# Patient Record
Sex: MALE | Race: WHITE | NOT HISPANIC OR LATINO | Employment: FULL TIME | URBAN - METROPOLITAN AREA
[De-identification: names, ages, dates, MRNs, and addresses within clinical notes are randomized per-mention and may not be internally consistent; named-entity substitution may affect disease eponyms.]

---

## 2021-06-18 ENCOUNTER — OFFICE VISIT (OUTPATIENT)
Dept: URGENT CARE | Facility: CLINIC | Age: 36
End: 2021-06-18

## 2021-06-18 VITALS
WEIGHT: 188.6 LBS | SYSTOLIC BLOOD PRESSURE: 118 MMHG | DIASTOLIC BLOOD PRESSURE: 78 MMHG | HEART RATE: 66 BPM | TEMPERATURE: 97.6 F | HEIGHT: 69 IN | OXYGEN SATURATION: 97 % | RESPIRATION RATE: 14 BRPM | BODY MASS INDEX: 27.93 KG/M2

## 2021-06-18 DIAGNOSIS — J30.2 SEASONAL ALLERGIES: ICD-10-CM

## 2021-06-18 DIAGNOSIS — J02.9 PHARYNGITIS, UNSPECIFIED ETIOLOGY: ICD-10-CM

## 2021-06-18 DIAGNOSIS — H61.21 IMPACTED CERUMEN OF RIGHT EAR: ICD-10-CM

## 2021-06-18 DIAGNOSIS — H65.92 OTITIS MEDIA WITH EFFUSION, LEFT: Primary | ICD-10-CM

## 2021-06-18 LAB
INT CON NEG: NORMAL
INT CON POS: NORMAL
S PYO AG THROAT QL: NEGATIVE

## 2021-06-18 PROCEDURE — 69210 REMOVE IMPACTED EAR WAX UNI: CPT | Performed by: PHYSICIAN ASSISTANT

## 2021-06-18 PROCEDURE — 87880 STREP A ASSAY W/OPTIC: CPT | Performed by: PHYSICIAN ASSISTANT

## 2021-06-18 PROCEDURE — 99203 OFFICE O/P NEW LOW 30 MIN: CPT | Mod: 25 | Performed by: PHYSICIAN ASSISTANT

## 2021-06-18 RX ORDER — AMOXICILLIN 875 MG/1
875 TABLET, COATED ORAL 2 TIMES DAILY
Qty: 20 TABLET | Refills: 0 | Status: SHIPPED | OUTPATIENT
Start: 2021-06-18

## 2021-06-18 ASSESSMENT — ENCOUNTER SYMPTOMS
HEADACHES: 1
TROUBLE SWALLOWING: 1
FEVER: 0
CHILLS: 0
COUGH: 0
SORE THROAT: 1
SWOLLEN GLANDS: 1
STRIDOR: 0
SINUS PAIN: 0

## 2021-06-18 NOTE — PROGRESS NOTES
Subjective:      Samson Willis is a 35 y.o. male who presents with Pharyngitis (couple days, left ear pain, left side of throat hurts)    Medications:    • none    Allergies: Patient has no known allergies.    Problem List: Samson Willis does not have a problem list on file.    Surgical History:  No past surgical history on file.    Past Social Hx: Samson Willis  reports that he has never smoked. His smokeless tobacco use includes chew.     Past Family Hx:  Samson Willis family history is not on file. Not pertinent to today's visit.       Problem list, medications, and allergies reviewed by myself today in Epic.          Patient presents with:  Pharyngitis: couple days, left ear pain, left side of throat hurt x 4 days.  Patient has tried multiple over-the-counter medications for his symptoms with no relief.  Patient denies fever, chills, nausea vomiting or diarrhea.    Pharyngitis   This is a new problem. The current episode started in the past 7 days. The problem has been gradually worsening. The pain is worse on the left side. There has been no fever. The pain is at a severity of 7/10. Associated symptoms include congestion, headaches, a plugged ear sensation, swollen glands and trouble swallowing (Secondary to pain). Pertinent negatives include no coughing, ear discharge or stridor. He has tried cool liquids, gargles, NSAIDs and acetaminophen for the symptoms. The treatment provided no relief.       Review of Systems   Constitutional: Negative for chills and fever.   HENT: Positive for congestion, sore throat and trouble swallowing (Secondary to pain). Negative for ear discharge and sinus pain.    Respiratory: Negative for cough and stridor.    Skin: Negative for rash.   Neurological: Positive for headaches.   Endo/Heme/Allergies: Positive for environmental allergies.   All other systems reviewed and are negative.         Objective:     /78 (BP Location: Left arm, Patient Position: Sitting, BP Cuff Size: Adult)    "Pulse 66   Temp 36.4 °C (97.6 °F) (Temporal)   Resp 14   Ht 1.753 m (5' 9\")   Wt 85.5 kg (188 lb 9.6 oz)   SpO2 97%   BMI 27.85 kg/m²      Physical Exam  Vitals and nursing note reviewed.   Constitutional:       General: He is not in acute distress.     Appearance: Normal appearance. He is well-developed and normal weight. He is not ill-appearing or toxic-appearing.   HENT:      Head: Normocephalic.      Right Ear: Tympanic membrane normal. No decreased hearing noted. There is impacted cerumen.      Left Ear: A middle ear effusion is present. Tympanic membrane is erythematous and bulging.      Nose: Rhinorrhea present. Rhinorrhea is clear.      Mouth/Throat:      Lips: Pink.      Mouth: Mucous membranes are moist.      Pharynx: Uvula midline. Posterior oropharyngeal erythema present. No uvula swelling.      Tonsils: No tonsillar abscesses.     Eyes:      Extraocular Movements: Extraocular movements intact.      Conjunctiva/sclera: Conjunctivae normal.      Pupils: Pupils are equal, round, and reactive to light.   Cardiovascular:      Rate and Rhythm: Normal rate and regular rhythm.      Heart sounds: Normal heart sounds.   Pulmonary:      Effort: Pulmonary effort is normal.      Breath sounds: Normal breath sounds.   Abdominal:      Palpations: Abdomen is soft.   Musculoskeletal:         General: Normal range of motion.      Cervical back: Normal range of motion and neck supple.   Lymphadenopathy:      Cervical: Cervical adenopathy present.   Skin:     General: Skin is warm and dry.      Capillary Refill: Capillary refill takes less than 2 seconds.   Neurological:      Mental Status: He is alert and oriented to person, place, and time.      Gait: Gait normal.   Psychiatric:         Mood and Affect: Mood normal.            Strep: neg     Assessment/Plan:     1. Otitis media with effusion, left  amoxicillin (AMOXIL) 875 MG tablet    POCT Rapid Strep A   2. Pharyngitis, unspecified etiology  amoxicillin (AMOXIL) " 875 MG tablet    POCT Rapid Strep A   3. Impacted cerumen of right ear  amoxicillin (AMOXIL) 875 MG tablet    POCT Rapid Strep A   4. Seasonal allergies       Begin Rx amoxicillin BID for OM of left ear.   Motrin/Advil/Ibuprophen 600 mg every 6 hours as needed for pain or fever.  PT advised saltwater gargles/swishes  3-4 times daily until symptoms improve.   PT was instructed to begin a daily OTC allergy medication for relief of allergy symptoms.  Ex: allegra, claritin, zyrtec, allerclear, etc.   Pt can also take OTC benadryl at bedtime if symptoms are keeping them awake at night.     PT should follow up with PCP in 1-2 days for re-evaluation if symptoms have not improved.      Discussed red flags and reasons to return to UC or ED.      Pt and/or family verbalized understanding of diagnosis and follow up instructions and was offered informational handout on diagnosis.  PT discharged.

## 2021-06-18 NOTE — PROCEDURES
Ear Wax Removal    Date/Time: 6/18/2021 2:25 PM  Performed by: Nan Dodge P.A.-C.  Authorized by: Nan Dodge P.A.-C.     Anesthesia:  Local Anesthetic: none  Location details: right ear  Patient tolerance: patient tolerated the procedure well with no immediate complications  Procedure type: irrigation (Currette and irrigation)      Sedation:  Patient sedated: no

## 2021-06-18 NOTE — LETTER
June 18, 2021         Patient: Samson Willis   YOB: 1985   Date of Visit: 6/18/2021           To Whom it May Concern:    Samson Willis was seen in my clinic on 6/18/2021. He may return to work on 6/21/2021 or sooner if condition improves sooner.       If you have any questions or concerns, please don't hesitate to call.        Sincerely,           Nan Dodge P.A.-C.  Electronically Signed